# Patient Record
Sex: MALE | Race: WHITE | Employment: FULL TIME | URBAN - METROPOLITAN AREA
[De-identification: names, ages, dates, MRNs, and addresses within clinical notes are randomized per-mention and may not be internally consistent; named-entity substitution may affect disease eponyms.]

---

## 2023-04-08 ENCOUNTER — HOSPITAL ENCOUNTER (EMERGENCY)
Age: 23
Discharge: HOME HEALTH | End: 2023-04-08
Attending: EMERGENCY MEDICINE

## 2023-04-08 ENCOUNTER — APPOINTMENT (OUTPATIENT)
Dept: GENERAL RADIOLOGY | Age: 23
End: 2023-04-08

## 2023-04-08 VITALS
RESPIRATION RATE: 14 BRPM | HEART RATE: 90 BPM | BODY MASS INDEX: 25.11 KG/M2 | TEMPERATURE: 98.2 F | WEIGHT: 160 LBS | OXYGEN SATURATION: 97 % | SYSTOLIC BLOOD PRESSURE: 143 MMHG | DIASTOLIC BLOOD PRESSURE: 85 MMHG | HEIGHT: 67 IN

## 2023-04-08 DIAGNOSIS — S59.222A CLOSED NONDISPLACED SALTER-HARRIS TYPE II PHYSEAL FRACTURE OF DISTAL END OF LEFT RADIUS: Primary | ICD-10-CM

## 2023-04-08 PROCEDURE — 73110 X-RAY EXAM OF WRIST: CPT

## 2023-04-08 RX ORDER — NAPROXEN 500 MG/1
500 TABLET ORAL 2 TIMES DAILY
Qty: 14 TABLET | Refills: 0 | Status: SHIPPED | OUTPATIENT
Start: 2023-04-08

## 2023-04-08 ASSESSMENT — PAIN DESCRIPTION - PAIN TYPE: TYPE: ACUTE PAIN

## 2023-04-08 ASSESSMENT — PAIN DESCRIPTION - DESCRIPTORS: DESCRIPTORS: ACHING

## 2023-04-08 ASSESSMENT — PAIN DESCRIPTION - LOCATION: LOCATION: WRIST

## 2023-04-08 ASSESSMENT — PAIN - FUNCTIONAL ASSESSMENT: PAIN_FUNCTIONAL_ASSESSMENT: 0-10

## 2023-04-08 ASSESSMENT — PAIN SCALES - GENERAL: PAINLEVEL_OUTOF10: 7

## 2023-04-08 ASSESSMENT — PAIN DESCRIPTION - FREQUENCY: FREQUENCY: CONTINUOUS

## 2023-04-08 ASSESSMENT — PAIN DESCRIPTION - ORIENTATION: ORIENTATION: LEFT

## 2023-04-08 NOTE — ED TRIAGE NOTES
Arrives with left wrist pain that started 30 minutes ago while playing basketball. Did a slam dunk and then fell down onto his left wrist. No other complaints.  Ice applied prior to arrival.

## 2023-04-08 NOTE — Clinical Note
Jami Jose was seen and treated in our emergency department on 4/8/2023. He may return to work on 04/11/2023. If you have any questions or concerns, please don't hesitate to call.       Steven Jennings MD

## 2023-04-08 NOTE — ED NOTES
Discharge instructions and prescription information was given to the patient who expressed understanding of information and follow up care.      Samy Dewey RN  04/08/23 0127

## 2023-04-08 NOTE — ED PROVIDER NOTES
history. History reviewed. No pertinent surgical history. History reviewed. No pertinent family history. Social History     Socioeconomic History    Marital status: Single     Spouse name: Not on file    Number of children: Not on file    Years of education: Not on file    Highest education level: Not on file   Occupational History    Not on file   Tobacco Use    Smoking status: Some Days     Types: Cigarettes     Passive exposure: Never    Smokeless tobacco: Never   Vaping Use    Vaping Use: Some days   Substance and Sexual Activity    Alcohol use: Not on file    Drug use: Never    Sexual activity: Not on file   Other Topics Concern    Not on file   Social History Narrative    Not on file     Social Determinants of Health     Financial Resource Strain: Not on file   Food Insecurity: Not on file   Transportation Needs: Not on file   Physical Activity: Not on file   Stress: Not on file   Social Connections: Not on file   Intimate Partner Violence: Not on file   Housing Stability: Not on file     No current facility-administered medications for this encounter. Current Outpatient Medications   Medication Sig Dispense Refill    naproxen (NAPROSYN) 500 MG tablet Take 1 tablet by mouth 2 times daily 14 tablet 0     No Known Allergies    Nursing Notes Reviewed    I have reviewed and interpreted all of the currently available lab results from this visit (if applicable):  No results found for this visit on 04/08/23. Radiographs (if obtained):  Radiologist's Report Reviewed:  XR WRIST LEFT (MIN 3 VIEWS)   Final Result   Apparently delayed bony age with persistent physis of the distal radius. There is a possible Salter-Serrano type 2 injury at the far dorsum of the   distal radius seen only on the lateral radiograph. Recommend follow-up   radiographs in 7-10 days.                  Comment: Please note this report has been produced using speech recognition software and may contain errors related to that system

## 2023-04-08 NOTE — DISCHARGE INSTRUCTIONS
Return immediately to the emergency department if you experience new or worsening symptoms, increased pain, changes in color or sensation of your hand, or for any other concerns. Rest, ice, elevate as discussed.

## 2023-05-01 ENCOUNTER — OFFICE VISIT (OUTPATIENT)
Dept: ORTHOPEDIC SURGERY | Age: 23
End: 2023-05-01
Payer: COMMERCIAL

## 2023-05-01 ENCOUNTER — TELEPHONE (OUTPATIENT)
Dept: ORTHOPEDIC SURGERY | Age: 23
End: 2023-05-01

## 2023-05-01 ENCOUNTER — HOSPITAL ENCOUNTER (OUTPATIENT)
Age: 23
Discharge: HOME | End: 2023-05-01
Payer: COMMERCIAL

## 2023-05-01 ENCOUNTER — HOSPITAL ENCOUNTER (OUTPATIENT)
Dept: GENERAL RADIOLOGY | Age: 23
Discharge: HOME | End: 2023-05-01
Payer: COMMERCIAL

## 2023-05-01 VITALS
HEART RATE: 82 BPM | DIASTOLIC BLOOD PRESSURE: 69 MMHG | WEIGHT: 158 LBS | SYSTOLIC BLOOD PRESSURE: 126 MMHG | HEIGHT: 67 IN | OXYGEN SATURATION: 98 % | BODY MASS INDEX: 24.8 KG/M2

## 2023-05-01 DIAGNOSIS — M25.532 LEFT WRIST PAIN: ICD-10-CM

## 2023-05-01 DIAGNOSIS — S59.222D SALTER-HARRIS TYPE II PHYSEAL FRACTURE OF DISTAL END OF LEFT RADIUS WITH ROUTINE HEALING, SUBSEQUENT ENCOUNTER: Primary | ICD-10-CM

## 2023-05-01 DIAGNOSIS — S59.222A SALTER-HARRIS TYPE II PHYSEAL FRACTURE OF DISTAL END OF LEFT RADIUS, INITIAL ENCOUNTER: ICD-10-CM

## 2023-05-01 DIAGNOSIS — M25.532 LEFT WRIST PAIN: Primary | ICD-10-CM

## 2023-05-01 PROCEDURE — 73110 X-RAY EXAM OF WRIST: CPT

## 2023-05-01 PROCEDURE — 99213 OFFICE O/P EST LOW 20 MIN: CPT | Performed by: STUDENT IN AN ORGANIZED HEALTH CARE EDUCATION/TRAINING PROGRAM

## 2023-05-01 ASSESSMENT — ENCOUNTER SYMPTOMS
COLOR CHANGE: 0
DIARRHEA: 0
WHEEZING: 0
BACK PAIN: 0
SORE THROAT: 0
NAUSEA: 0
SHORTNESS OF BREATH: 0
VOMITING: 0
COUGH: 0

## 2023-05-01 NOTE — PROGRESS NOTES
5/1/2023   Chief Complaint   Patient presents with    Follow-up     Left radius fracture      Updated HPI: Patient is here today for reevaluation of left upper extremity injury. Patient states he has been compliant with brace use and restrictions. He continues off work. He is inquiring about return to work as a . He states this does involve heavy lifting at times. No new injuries since last being seen. He did not follow-up with endocrinologist as previously discussed. States his pain is greatly improved and has no complaints at this time. No changes in his health history. Previous HPI (4/10/2023):                             Catalina Willis is a 26 y.o. male right hand-dominant male referred by ER for evaluation and treatment of a Left wrist injury. Patient states this occurred 3 days prior when he fell when attempting to dunk a basketball. The pain is currently rated moderate. The patient was originally seen at local emergency room immediately after the injury where an x-ray was done, a fracture of the left distal radius identified and he  was placed in a sugartong splint. The patient was subsequently referred to Orthopedics for further management. The splint has remained in place and is clean and dry. He  denies prior injury to left wrist, denies associated injuries, prior injury to the upper extremity. Denies numbness, tingling, fever, chills. Patient works as a     Related history: negative for prior surgery, trauma, arthritis or disorders. Is affecting ADLs. Pain is 10/10 at it's worst.    Outside reports reviewed: ER note and imaging reviewed. Patient's medications, allergies, past medical, surgical, social and family histories were reviewed and updated as appropriate.      Patient has had no imaging thus far    Medical History  Patient's medications, allergies, past medical, surgical, social and family histories were reviewed and updated

## 2023-05-01 NOTE — PROGRESS NOTES
Patient comes in today for a 3 week follow up of a left distal radius fracture. He was last seen in our office on 4/10/23. He rates his pain at 0/10 currently. He reports being compliant with wearing the Exos brace as directed. He denies any new falls or injuries. X-rays were taken today.

## 2023-05-03 ENCOUNTER — TELEPHONE (OUTPATIENT)
Dept: ORTHOPEDIC SURGERY | Age: 23
End: 2023-05-03

## 2023-05-03 NOTE — TELEPHONE ENCOUNTER
Called patient and he requested a work note. He was seen on 5/1/23 and forgot to get a work note. Note was created and left at the  for him to .

## 2023-05-22 ENCOUNTER — OFFICE VISIT (OUTPATIENT)
Dept: ORTHOPEDIC SURGERY | Age: 23
End: 2023-05-22
Payer: COMMERCIAL

## 2023-05-22 VITALS
BODY MASS INDEX: 24.33 KG/M2 | HEART RATE: 82 BPM | WEIGHT: 155 LBS | DIASTOLIC BLOOD PRESSURE: 75 MMHG | OXYGEN SATURATION: 98 % | HEIGHT: 67 IN | SYSTOLIC BLOOD PRESSURE: 118 MMHG

## 2023-05-22 DIAGNOSIS — S59.222D SALTER-HARRIS TYPE II PHYSEAL FRACTURE OF DISTAL END OF LEFT RADIUS WITH ROUTINE HEALING, SUBSEQUENT ENCOUNTER: Primary | ICD-10-CM

## 2023-05-22 PROCEDURE — 99213 OFFICE O/P EST LOW 20 MIN: CPT | Performed by: STUDENT IN AN ORGANIZED HEALTH CARE EDUCATION/TRAINING PROGRAM

## 2023-05-22 ASSESSMENT — ENCOUNTER SYMPTOMS
NAUSEA: 0
WHEEZING: 0
COLOR CHANGE: 0
SORE THROAT: 0
COUGH: 0
SHORTNESS OF BREATH: 0
VOMITING: 0
DIARRHEA: 0
BACK PAIN: 0

## 2023-05-22 NOTE — PROGRESS NOTES
Patient comes in today for a 6 week follow up post left distal radius fx on 4/8/23. He was last seen in our office on 5/1/23. He rates his pain at 0/10 today. He denies any new falls or injuries. He did not get new x-rays today.
distal radius versus fracture. 3 views of a left wrist in a skeletally immature patient demonstrates:  Apparently delayed bony age with persistent physis of the distal radius. Possible Salter-Serrano type 2 injury at the far dorsum of the distal radius   seen only on the lateral radiograph. There is normal alignment. No acute   joint abnormality. No focal osseous lesion. No focal soft tissue abnormality. Office Procedures:  No orders of the defined types were placed in this encounter. Assessment and Plan    A: Salter-Serrano fracture of the left distal radius    P:   I had a thorough conversation with the patient regarding his previous imaging as well as his current physical exam findings treatment course. Because he had no concerning findings on previous imaging and he has no concerning findings on physical exam today and he has no complaints, I am okay with proceeding with allowing him to return activities as tolerated without additional imaging. However, if he does have any increasing pain or new injury I would like him to return immediately and we will obtain new imaging. He was given work notes for returning to activity and for being off last several weeks. He should avoid type of high-impact activity for the next month and should avoid any type of contact sports but otherwise can return activities as tolerated. He will continue with ice and over-the-counter pain medication. All questions were answered and patient voices understanding.       Electronically signed by Tin Putnam DO on 5/22/2023 at 2:59 PM